# Patient Record
(demographics unavailable — no encounter records)

---

## 2025-05-30 NOTE — ASSESSMENT
[Vaccines Reviewed] : Immunizations reviewed today. Please see immunization details in the vaccine log within the immunization flowsheet.  [FreeTextEntry1] : Lipids improved from last year- continue Zetia.  Note he has myalgias on statins.  Discussed diet and exercise.  The BP is fine on Amlodipine at 125/75.  Other blood tests reviewed and are unremarkable.  PSA fine.  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  Colonoscopy 3/1/24.  He sees a dermatologist and ophthalmologist.  Will defer Prevnar.  COVID-19 vaccine discussed.  Shingrix advised.

## 2025-05-30 NOTE — HISTORY OF PRESENT ILLNESS
[FreeTextEntry1] : The patient is here for a routine visit.  [de-identified] : His diet has been improved.  He exercises six days per week.  No chest pain or dyspnea.    No GI or  symptoms.

## 2025-05-30 NOTE — HEALTH RISK ASSESSMENT
[No falls in past year] : Patient reported no falls in the past year [0] : 2) Feeling down, depressed, or hopeless: Not at all (0) [Fully functional (bathing, dressing, toileting, transferring, walking, feeding)] : Fully functional (bathing, dressing, toileting, transferring, walking, feeding) [Fully functional (using the telephone, shopping, preparing meals, housekeeping, doing laundry, using] : Fully functional and needs no help or supervision to perform IADLs (using the telephone, shopping, preparing meals, housekeeping, doing laundry, using transportation, managing medications and managing finances) [[id number]] : 0